# Patient Record
Sex: MALE | URBAN - METROPOLITAN AREA
[De-identification: names, ages, dates, MRNs, and addresses within clinical notes are randomized per-mention and may not be internally consistent; named-entity substitution may affect disease eponyms.]

---

## 2023-05-15 ENCOUNTER — OFFICE VISIT (OUTPATIENT)
Facility: LOCATION | Age: 33
End: 2023-05-15
Payer: COMMERCIAL

## 2023-05-15 DIAGNOSIS — H53.9 UNSPECIFIED VISUAL DISTURBANCE: Primary | ICD-10-CM

## 2023-05-15 DIAGNOSIS — H52.223 REGULAR ASTIGMATISM, BILATERAL: ICD-10-CM

## 2023-05-15 PROCEDURE — 99204 OFFICE O/P NEW MOD 45 MIN: CPT

## 2023-05-15 ASSESSMENT — VISUAL ACUITY
OS: 20/20
OD: 20/20

## 2023-05-15 ASSESSMENT — INTRAOCULAR PRESSURE
OS: 16
OD: 18

## 2023-05-15 ASSESSMENT — KERATOMETRY
OS: 45.13
OD: 45.00

## 2023-05-15 NOTE — IMPRESSION/PLAN
Impression: Unspecified visual disturbance: H53.9. Plan: Normal floaters. Pt educated on findings. No retinal holes/breaks/tears/detachments seen today. Pt re-educated on signs/symptoms of a retinal detachment, including new flashes of light, changes in floaters, and/or a curtain in vision. If s/s noted, present to clinic or emergency room STAT. Pt expressed understanding.

## 2023-05-15 NOTE — IMPRESSION/PLAN
Impression: Regular astigmatism, bilateral: H52.223. Plan: Recommended getting MRx and wearing glasses for best vision. Made aware of refraction fee.

## 2023-05-25 ENCOUNTER — OFFICE VISIT (OUTPATIENT)
Facility: LOCATION | Age: 33
End: 2023-05-25
Payer: COMMERCIAL

## 2023-05-25 DIAGNOSIS — H52.223 REGULAR ASTIGMATISM, BILATERAL: Primary | ICD-10-CM

## 2023-05-25 PROCEDURE — 92014 COMPRE OPH EXAM EST PT 1/>: CPT

## 2023-05-25 ASSESSMENT — INTRAOCULAR PRESSURE
OS: 16
OD: 19

## 2023-05-25 ASSESSMENT — VISUAL ACUITY
OD: 20/20
OS: 20/20

## 2023-05-25 NOTE — IMPRESSION/PLAN
Impression: Regular astigmatism, bilateral: H52.223. Plan: Pt educated on minimal change to SRx today. Finalized SRx released. RTC 1-year for CEE; or sooner prn.